# Patient Record
Sex: MALE | Race: OTHER | NOT HISPANIC OR LATINO | Employment: OTHER | URBAN - METROPOLITAN AREA
[De-identification: names, ages, dates, MRNs, and addresses within clinical notes are randomized per-mention and may not be internally consistent; named-entity substitution may affect disease eponyms.]

---

## 2023-02-08 ENCOUNTER — HOSPITAL ENCOUNTER (EMERGENCY)
Facility: HOSPITAL | Age: 28
Discharge: HOME | End: 2023-02-08
Attending: EMERGENCY MEDICINE

## 2023-02-08 VITALS
TEMPERATURE: 97.7 F | HEART RATE: 98 BPM | SYSTOLIC BLOOD PRESSURE: 132 MMHG | WEIGHT: 152.2 LBS | OXYGEN SATURATION: 98 % | DIASTOLIC BLOOD PRESSURE: 89 MMHG | RESPIRATION RATE: 18 BRPM
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
## 2023-02-08 DIAGNOSIS — S61.451A DOG BITE OF RIGHT HAND, INITIAL ENCOUNTER: Primary | ICD-10-CM

## 2023-02-08 RX ORDER — AMOXICILLIN AND CLAVULANATE POTASSIUM 875; 125 MG/1; MG/1
1 TABLET, FILM COATED ORAL EVERY 12 HOURS SCHEDULED
Qty: 14 TABLET | Refills: 0 | Status: SHIPPED | OUTPATIENT
Start: 2023-02-08 | End: 2023-02-15

## 2023-02-08 RX ADMIN — TETANUS TOXOID, REDUCED DIPHTHERIA TOXOID AND ACELLULAR PERTUSSIS VACCINE, ADSORBED 0.5 ML: 5; 2.5; 8; 8; 2.5 SUSPENSION INTRAMUSCULAR at 12:42

## 2023-02-08 NOTE — ED PROVIDER NOTES
History  Chief Complaint   Patient presents with   • Dog Bite     Patient bit by own dog; puncture sites to right hand and left pinky finger  Dog was vaccinated for rabies, but unsure when  Patient does not know last tetanus shot  HPI  Patient is a 14-year-old male past medical history of schizoaffective schizophrenia, GERD presenting today with a dog bite to the right hand and left pinky  Reports that it was his own dog that bit him  Reports that there were leftovers in the yard and he was trying to take the leftovers away from the dog with a dark bit him  The dog has otherwise been acting appropriately  He reports he came straight to the emergency department after the bite  The dog has been rabies vaccinated however it was several years ago  Patient himself is up-to-date on vaccinations but is unsure about tetanus  Will update at this time  He denies any numbness, weakness  Has otherwise been in usual state of health and denies any other injuries denies falls since had loss consciousness or blood thinner use  None       Past Medical History:   Diagnosis Date   • GERD (gastroesophageal reflux disease)    • Schizo affective schizophrenia ([facility] [address] )    • Tachycardia        History reviewed  No pertinent surgical history  History reviewed  No pertinent family history  I have reviewed and agree with the history as documented  E-Cigarette/Vaping   • E-Cigarette Use Never User      E-Cigarette/Vaping Substances   • Nicotine No    • THC No    • CBD No    • Flavoring No      Social History     Tobacco Use   • Smoking status: Every Day     Types: Cigarettes   Vaping Use   • Vaping Use: Never used   Substance Use Topics   • Alcohol use: No   • Drug use: No       Review of Systems   Constitutional: Negative for chills and fever  HENT: Negative for congestion, rhinorrhea and sore throat  Eyes: Negative for redness and visual disturbance  Respiratory: Negative for cough and shortness of breath  Cardiovascular: Negative for chest pain and palpitations  Gastrointestinal: Negative for constipation, diarrhea, nausea and vomiting  Genitourinary: Negative for dysuria and hematuria  Musculoskeletal: Negative for myalgias and neck pain  Skin: Positive for wound  Negative for rash  Allergic/Immunologic: Negative for immunocompromised state  Neurological: Negative for seizures and syncope  Psychiatric/Behavioral: Negative for confusion and suicidal ideas  Physical Exam  Physical Exam  Vitals and nursing note reviewed  Constitutional:       General: He is not in acute distress  Appearance: Normal appearance  He is well-developed  HENT:      Head: Normocephalic and atraumatic  No raccoon eyes  Right Ear: External ear normal       Left Ear: External ear normal       Nose: Nose normal  No congestion  Mouth/Throat:      Lips: Pink  Mouth: Mucous membranes are moist    Eyes:      General: Lids are normal  No scleral icterus  Extraocular Movements: Extraocular movements intact  Cardiovascular:      Rate and Rhythm: Normal rate and regular rhythm  Heart sounds: No murmur heard  No friction rub  Pulmonary:      Effort: Pulmonary effort is normal  No respiratory distress  Breath sounds: No wheezing or rhonchi  Abdominal:      General: Abdomen is flat  Palpations: Abdomen is soft  Tenderness: There is no abdominal tenderness  There is no guarding or rebound  Musculoskeletal:      Cervical back: Normal range of motion  No torticollis  Comments: Punctate laceration over thenar eminence and lateral hand  Abrasions over left pinky  Hemostatic at this time  Not amenable to repair  Skin:     General: Skin is warm and dry  Coloration: Skin is not jaundiced  Findings: No rash  Neurological:      Mental Status: He is alert and oriented to person, place, and time  Mental status is at baseline     Psychiatric:         Behavior: Behavior normal  Behavior is cooperative  Vital Signs  ED Triage Vitals [02/08/23 1212]   Temperature Pulse Respirations Blood Pressure SpO2   97 7 °F (36 5 °C) 98 18 132/89 98 %      Temp Source Heart Rate Source Patient Position - Orthostatic VS BP Location FiO2 (%)   Oral Monitor Sitting Left arm --      Pain Score       --           Vitals:    02/08/23 1212   BP: 132/89   Pulse: 98   Patient Position - Orthostatic VS: Sitting         Visual Acuity      ED Medications  Medications   tetanus-diphtheria-acellular pertussis (BOOSTRIX) IM injection 0 5 mL (0 5 mL Intramuscular Given 2/8/23 1242)       Diagnostic Studies  Results Reviewed     None                 No orders to display              Procedures  Procedures         ED Course                               SBIRT 22yo+    Flowsheet Row Most Recent Value   SBIRT (25 yo +)    In order to provide better care to our patients, we are screening all of our patients for alcohol and drug use  Would it be okay to ask you these screening questions? No Filed at: 02/08/2023 1234                    [facility]  Patient on arrival is ambulatory to room is in no acute distress, vital signs stable, afebrile  On exam lungs clear auscultation, heart without murmurs rubs or gallops abdomen soft nontender  Lacerations to right hand punctate not amenable to repair  Abrasions to left fifth digit  Wounds are amenable to repair  Tetanus updated  Advised to keep clean with warm soapy water dress appropriately and keep dressings clean dry and intact  We will watch his dog at home for any signs of rabies including altered behavioral, foaming at mouth, and aggression  Will discharge in stable condition return precautions provided    Disposition  Final diagnoses:   Dog bite of right hand, initial encounter     Time reflects when diagnosis was documented in both MDM as applicable and the Disposition within this note     Time User Action Codes Description Comment    2/8/2023 12:28 PM Tristin Carson Cowan Prey  0XXA] Dog bite of right hand, initial encounter       ED Disposition     ED Disposition   Discharge    Condition   Stable    Date/Time   Wed Feb 8, 2023 [address] discharge to home/self care  Follow-up Information    None         Discharge Medication List as of 2/8/2023 12:31 PM      START taking these medications    Details   amoxicillin-clavulanate (AUGMENTIN) 875-125 mg per tablet Take 1 tablet by mouth every 12 (twelve) hours for 7 days, Starting Wed 2/8/2023, Until Wed 2/15/2023, Normal             No discharge procedures on file      PDMP Review     None          ED Provider  Electronically Signed by           Solia Abel MD  02/08/23 1163

## 2023-02-08 NOTE — DISCHARGE INSTRUCTIONS
Please keep the wounds clean and dry wash several times per day with warm soapy water  Please take the antibiotics as prescribed  Please reconfirm your dog's vaccination status  If the rabies vaccine is out of date then you will need to return for rabies vaccination  If you develop fevers or chills numbness, weakness or any new or worsening symptoms please return to the ED for further evaluation